# Patient Record
Sex: FEMALE | Race: WHITE | NOT HISPANIC OR LATINO | URBAN - METROPOLITAN AREA
[De-identification: names, ages, dates, MRNs, and addresses within clinical notes are randomized per-mention and may not be internally consistent; named-entity substitution may affect disease eponyms.]

---

## 2024-12-13 ENCOUNTER — OFFICE VISIT (OUTPATIENT)
Dept: URGENT CARE | Age: 69
End: 2024-12-13
Payer: COMMERCIAL

## 2024-12-13 VITALS
SYSTOLIC BLOOD PRESSURE: 130 MMHG | DIASTOLIC BLOOD PRESSURE: 97 MMHG | TEMPERATURE: 97.9 F | OXYGEN SATURATION: 97 % | HEART RATE: 100 BPM | RESPIRATION RATE: 18 BRPM | HEIGHT: 67 IN | BODY MASS INDEX: 31.39 KG/M2 | WEIGHT: 200 LBS

## 2024-12-13 DIAGNOSIS — J02.9 SORE THROAT: Primary | ICD-10-CM

## 2024-12-13 DIAGNOSIS — J01.90 ACUTE SINUSITIS, RECURRENCE NOT SPECIFIED, UNSPECIFIED LOCATION: ICD-10-CM

## 2024-12-13 PROCEDURE — 99203 OFFICE O/P NEW LOW 30 MIN: CPT | Performed by: FAMILY MEDICINE

## 2024-12-13 PROCEDURE — 1036F TOBACCO NON-USER: CPT | Performed by: FAMILY MEDICINE

## 2024-12-13 PROCEDURE — 3008F BODY MASS INDEX DOCD: CPT | Performed by: FAMILY MEDICINE

## 2024-12-13 PROCEDURE — 1159F MED LIST DOCD IN RCRD: CPT | Performed by: FAMILY MEDICINE

## 2024-12-13 RX ORDER — AMOXICILLIN 875 MG/1
875 TABLET, FILM COATED ORAL 2 TIMES DAILY
Qty: 20 TABLET | Refills: 0 | Status: SHIPPED | OUTPATIENT
Start: 2024-12-13 | End: 2024-12-23

## 2024-12-13 NOTE — PROGRESS NOTES
"Subjective   Patient ID: Sarahy Chavez is a 69 y.o. female. They present today with a chief complaint of Sore Throat, Nasal Congestion, Cough, Vomiting, Headache, Earache, and Illness (Going on x1 month. Cough is productive, right sided earache. ).    History of Present Illness  HPI  Several of cough, congestion, postnasal drip runny nose.  Nasal discharge has become thick and yellow.  Now with sore throat.  No fevers have been noted. Patient denies shortness of breath, chest pain, or wheezing. No red eyes, eye pain, or discharge. They deny nausea vomiting or diarrhea. No known exposures to strep mono influenza, COVID-19 or pneumonia. No skin rashes are noted. Over-the-counter medications are offering minimal relief from symptoms.  Non-smoker      Past Medical History  Allergies as of 2024    (No Known Allergies)       (Not in a hospital admission)       Past Medical History:   Diagnosis Date    Personal history of malignant neoplasm of cervix uteri     History of cervical cancer       Past Surgical History:   Procedure Laterality Date     SECTION, LOW TRANSVERSE  2016     Section Low Transverse    HYSTERECTOMY  2016    Hysterectomy    OTHER SURGICAL HISTORY  2016    Salpingo-oophorectomy    OTHER SURGICAL HISTORY  2017    Breast Reconstruction With Implant Prosthesis Left Breast    OTHER SURGICAL HISTORY  2016    Breast Reconstruction With Tissue Expander Left Breast        reports that she has never smoked. She has never used smokeless tobacco.    Review of Systems  Review of Systems       As in history of present illness                        Objective    Vitals:    24 0821   BP: (!) 130/97   BP Location: Right arm   Patient Position: Sitting   Pulse: 100   Resp: 18   Temp: 36.6 °C (97.9 °F)   TempSrc: Oral   SpO2: 97%   Weight: 90.7 kg (200 lb)   Height: 1.702 m (5' 7\")     No LMP recorded.    Physical Exam  Gen.-alert cooperative and in no apparent " distress  Head and face- no swelling redness, tenderness or rash  Eyes-EOMI, no redness or discharge is noted  ENT- bilateral nasal congestion with clear rhinorrhea and postnasal drip in oral pharynx  Neck- normal range of motion with no lymphadenopathy or mass.   Pulmonary- no respiratory distress noted. Lungs clear to auscultation without wheezes rhonchi or rales  Skin- no rashes discoloration or skin lesions noted  Lymphatic-- no lymph node swelling or tenderness appreciated  Procedures    Point of Care Test & Imaging Results from this visit  No results found for this visit on 12/13/24.   No results found.    Diagnostic study results (if any) were reviewed by Jb Tobias MD.    Assessment/Plan   Allergies, medications, history, and pertinent labs/EKGs/Imaging reviewed by Jb Tobias MD.     Medical Decision Making  At time of discharge patient was clinically well-appearing and HDS for outpatient management. The patient and/or family was educated regarding diagnosis, supportive care, OTC and Rx medications. The patient and/or family was given the opportunity to ask questions prior to discharge.  They verbalized understanding of my discussion of the plans for treatment, expected course, indications to return to  or seek further evaluation in ED, and the need for timely follow up as directed.   They were provided with a work/school excuse if requested.    Orders and Diagnoses  Diagnoses and all orders for this visit:  Sore throat  Acute sinusitis, recurrence not specified, unspecified location      Medical Admin Record      Patient disposition: Home    Electronically signed by Jb Tobias MD  8:36 AM

## 2024-12-13 NOTE — PATIENT INSTRUCTIONS
Medication as directed  Increase fluids and rest  Decongestants, such as Mucinex D or Claritin-D and Flonase nasal spray as discussed  Motrin or Tylenol as needed for pain or fever  Gargle with lightly salted warm water several times a day  Follow-up if symptoms worsen